# Patient Record
Sex: FEMALE | Race: ASIAN | Employment: FULL TIME | URBAN - METROPOLITAN AREA
[De-identification: names, ages, dates, MRNs, and addresses within clinical notes are randomized per-mention and may not be internally consistent; named-entity substitution may affect disease eponyms.]

---

## 2019-10-23 PROBLEM — M35.9 UNDIFFERENTIATED CONNECTIVE TISSUE DISEASE (HCC): Status: ACTIVE | Noted: 2019-10-23

## 2019-12-18 ENCOUNTER — TELEPHONE (OUTPATIENT)
Dept: SURGERY | Facility: CLINIC | Age: 49
End: 2019-12-18

## 2019-12-18 DIAGNOSIS — B18.1 CHRONIC VIRAL HEPATITIS B WITHOUT DELTA AGENT AND WITHOUT COMA (HCC): Primary | ICD-10-CM

## 2019-12-19 NOTE — TELEPHONE ENCOUNTER
Seen at [facility], [place] on 12/17/19.  Summary -     Assessment & Plan:  New diagnosis of HBV    - Discussed that since born in [facility] it would be most likely she acquired this when younger/child, although it would be strange to get through 5 pregnancies without be

## 2020-01-16 ENCOUNTER — HOSPITAL ENCOUNTER (OUTPATIENT)
Dept: ULTRASOUND IMAGING | Facility: HOSPITAL | Age: 50
Discharge: HOME | End: 2020-01-16
Attending: INTERNAL MEDICINE
Payer: COMMERCIAL

## 2020-01-16 DIAGNOSIS — B18.1 CHRONIC VIRAL HEPATITIS B WITHOUT DELTA AGENT AND WITHOUT COMA (HCC): ICD-10-CM

## 2020-01-16 PROCEDURE — 76981 USE PARENCHYMA: CPT | Performed by: INTERNAL MEDICINE

## 2020-01-16 PROCEDURE — 76705 ECHO EXAM OF ABDOMEN: CPT | Performed by: INTERNAL MEDICINE

## 2020-07-05 DIAGNOSIS — B18.1 CHRONIC VIRAL HEPATITIS B WITHOUT DELTA AGENT AND WITHOUT COMA (HCC): Primary | ICD-10-CM

## 2021-03-13 ENCOUNTER — LAB ENCOUNTER (OUTPATIENT)
Dept: LAB | Age: 51
End: 2021-03-13
Attending: INTERNAL MEDICINE
Payer: COMMERCIAL

## 2021-03-13 ENCOUNTER — HOSPITAL ENCOUNTER (OUTPATIENT)
Dept: ULTRASOUND IMAGING | Age: 51
Discharge: HOME | End: 2021-03-13
Attending: INTERNAL MEDICINE
Payer: COMMERCIAL

## 2021-03-13 DIAGNOSIS — B18.1 CHRONIC VIRAL HEPATITIS B WITHOUT DELTA AGENT AND WITHOUT COMA (HCC): ICD-10-CM

## 2021-03-13 PROCEDURE — 87517 HEPATITIS B DNA QUANT: CPT

## 2021-03-13 PROCEDURE — 76700 US EXAM ABDOM COMPLETE: CPT | Performed by: INTERNAL MEDICINE

## 2021-03-13 PROCEDURE — 36415 COLL VENOUS BLD VENIPUNCTURE: CPT

## 2021-03-20 LAB
HBV QNT BY NAAT (IU/ML): 1150 IU/ML
HBV QNT BY NAAT (LOG IU/ML): 3.06
HBV QNT BY NAAT INTERP: DETECTED

## 2021-04-09 DIAGNOSIS — B18.1 CHRONIC VIRAL HEPATITIS B WITHOUT DELTA AGENT AND WITHOUT COMA (HCC): Primary | ICD-10-CM
